# Patient Record
Sex: MALE | Employment: OTHER | URBAN - METROPOLITAN AREA
[De-identification: names, ages, dates, MRNs, and addresses within clinical notes are randomized per-mention and may not be internally consistent; named-entity substitution may affect disease eponyms.]

---

## 2017-01-24 PROBLEM — J06.9 UPPER RESPIRATORY INFECTION WITH COUGH AND CONGESTION: Status: ACTIVE | Noted: 2017-01-24

## 2021-11-06 ENCOUNTER — OFFICE VISIT (OUTPATIENT)
Dept: URGENT CARE | Facility: CLINIC | Age: 76
End: 2021-11-06
Payer: MEDICARE

## 2021-11-06 VITALS
WEIGHT: 171 LBS | BODY MASS INDEX: 23.94 KG/M2 | SYSTOLIC BLOOD PRESSURE: 142 MMHG | HEART RATE: 71 BPM | DIASTOLIC BLOOD PRESSURE: 88 MMHG | OXYGEN SATURATION: 98 % | RESPIRATION RATE: 20 BRPM | HEIGHT: 71 IN | TEMPERATURE: 97.7 F

## 2021-11-06 DIAGNOSIS — S61.412A LACERATION OF LEFT HAND WITHOUT FOREIGN BODY, INITIAL ENCOUNTER: ICD-10-CM

## 2021-11-06 PROCEDURE — 12002 RPR S/N/AX/GEN/TRNK2.6-7.5CM: CPT | Performed by: NURSE PRACTITIONER

## 2021-11-06 ASSESSMENT — ENCOUNTER SYMPTOMS: ROS SKIN COMMENTS: HAND LACERATION

## 2021-11-06 NOTE — PROGRESS NOTES
Subjective     Anthony Lange is a 76 y.o. male who presents with Laceration (left hand )    No past medical history on file.    Social History     Socioeconomic History   • Marital status: Unknown     Spouse name: Not on file   • Number of children: Not on file   • Years of education: Not on file   • Highest education level: Not on file   Occupational History   • Not on file   Tobacco Use   • Smoking status: Never Smoker   • Smokeless tobacco: Never Used   Substance and Sexual Activity   • Alcohol use: Not on file   • Drug use: Not on file   • Sexual activity: Not on file   Other Topics Concern   • Not on file   Social History Narrative   • Not on file     Social Determinants of Health     Financial Resource Strain:    • Difficulty of Paying Living Expenses:    Food Insecurity:    • Worried About Running Out of Food in the Last Year:    • Ran Out of Food in the Last Year:    Transportation Needs:    • Lack of Transportation (Medical):    • Lack of Transportation (Non-Medical):    Physical Activity:    • Days of Exercise per Week:    • Minutes of Exercise per Session:    Stress:    • Feeling of Stress :    Social Connections:    • Frequency of Communication with Friends and Family:    • Frequency of Social Gatherings with Friends and Family:    • Attends [employer] Services:    • Active Member of Clubs or Organizations:    • Attends Club or Organization Meetings:    • Marital Status:    Intimate Partner Violence:    • Fear of Current or Ex-Partner:    • Emotionally Abused:    • Physically Abused:    • Sexually Abused:      No family history on file.    Allergies: Patient has no known allergies.    Patient is a 76-year-old male who presents today with complaint of laceration to his left hand.  Patient states he fell in his shower this morning and cut his hand on the side of the hinge.  Has been keeping pressure on it since.          Laceration   The incident occurred 12 to 24 hours ago. Pain location: hand. The  laceration is 3 cm in size.       Review of Systems   Skin:        Hand laceration   All other systems reviewed and are negative.             Objective     There were no vitals taken for this visit.     Physical Exam  Vitals reviewed.   Constitutional:       Appearance: Normal appearance.   Musculoskeletal:        Hands:       Comments: Laceration to the ulnar aspect on the palmar side of the left hand.  Laceration does not extend into the subcutaneous tissue and no active bleeding.  No exposure of subcutaneous tissue.  Full range of motion without pain.   Neurological:      Mental Status: He is alert.       Procedure: Wound was cleaned with Betadine and then infiltrated with 2% lidocaine without epinephrine.  Total of 2 mL used.  Good anesthesia achieved.  Under sterile technique wound was sutured with 3 simple interrupted stitches with 5-0 Ethilon.  Wound edges well approximated and patient tolerated procedure well.  Teaching done regarding wound care.  Return in 10 days for suture removal, return sooner for any signs of infection including redness, swelling, drainage, or pus.                      Assessment & Plan   Left hand lacertation    Tdap is current within the last 2 weeks  Keep wound clean and dry  Teaching done regarding wound care  Return in 10 days for suture removal  Return immediately for any signs of infection     There are no diagnoses linked to this encounter.